# Patient Record
Sex: FEMALE | Race: BLACK OR AFRICAN AMERICAN | NOT HISPANIC OR LATINO | Employment: UNEMPLOYED | URBAN - METROPOLITAN AREA
[De-identification: names, ages, dates, MRNs, and addresses within clinical notes are randomized per-mention and may not be internally consistent; named-entity substitution may affect disease eponyms.]

---

## 2023-02-07 PROBLEM — L83 ACANTHOSIS NIGRICANS: Status: ACTIVE | Noted: 2023-02-07

## 2023-02-07 PROBLEM — R03.0 ELEVATED BLOOD PRESSURE READING: Status: ACTIVE | Noted: 2023-02-07

## 2023-02-07 PROBLEM — D64.9 ANEMIA: Status: ACTIVE | Noted: 2023-02-07

## 2023-02-07 PROBLEM — M79.642 PAIN OF LEFT HAND: Status: ACTIVE | Noted: 2023-02-07

## 2023-02-07 PROBLEM — L72.0 EPIDERMOID CYST OF SKIN: Status: ACTIVE | Noted: 2023-02-07

## 2023-02-07 PROBLEM — E66.9 OBESITY: Status: RESOLVED | Noted: 2023-02-07 | Resolved: 2023-02-07

## 2023-02-07 PROBLEM — R63.5 ABNORMAL WEIGHT GAIN: Status: ACTIVE | Noted: 2023-02-07

## 2023-02-07 PROBLEM — N63.0 BREAST LUMP: Status: RESOLVED | Noted: 2023-02-07 | Resolved: 2023-02-07

## 2023-02-07 PROBLEM — R73.01 IMPAIRED FASTING GLUCOSE: Status: ACTIVE | Noted: 2023-02-07

## 2023-02-07 PROBLEM — R73.9 HYPERGLYCEMIA: Status: RESOLVED | Noted: 2023-02-07 | Resolved: 2023-02-07

## 2023-02-07 PROBLEM — R35.0 INCREASED FREQUENCY OF URINATION: Status: RESOLVED | Noted: 2023-02-07 | Resolved: 2023-02-07

## 2023-02-07 PROBLEM — O10.013: Status: ACTIVE | Noted: 2023-02-07

## 2023-02-07 PROBLEM — E88.819 INSULIN RESISTANCE: Status: ACTIVE | Noted: 2023-02-07

## 2023-02-07 PROBLEM — L91.8 SKIN TAGS, MULTIPLE ACQUIRED: Status: ACTIVE | Noted: 2023-02-07

## 2023-03-10 LAB
GRAM STAIN: ABNORMAL
TISSUE/WOUND CULTURE/SMEAR: ABNORMAL
TISSUE/WOUND CULTURE/SMEAR: ABNORMAL

## 2023-03-25 RX ORDER — NIFEDIPINE 30 MG/1
1 TABLET, FILM COATED, EXTENDED RELEASE ORAL EVERY 12 HOURS
COMMUNITY
Start: 2022-08-22

## 2023-03-25 RX ORDER — FERROUS SULFATE 325(65) MG
1 TABLET ORAL
COMMUNITY

## 2023-03-25 RX ORDER — BLOOD-GLUCOSE CONTROL, NORMAL
EACH MISCELLANEOUS
COMMUNITY

## 2023-03-25 RX ORDER — PEN NEEDLE, DIABETIC 30 GX3/16"
NEEDLE, DISPOSABLE MISCELLANEOUS
COMMUNITY

## 2023-03-25 RX ORDER — PRENATAL VIT NO.126/IRON/FOLIC 28MG-0.8MG
1 TABLET ORAL DAILY
COMMUNITY
Start: 2022-08-04

## 2023-03-25 RX ORDER — DOCUSATE SODIUM 100 MG/1
1 CAPSULE, LIQUID FILLED ORAL 2 TIMES DAILY
COMMUNITY
Start: 2022-08-22

## 2023-03-25 RX ORDER — INSULIN PUMP SYRINGE, 3 ML
EACH MISCELLANEOUS
COMMUNITY

## 2023-03-30 ENCOUNTER — APPOINTMENT (OUTPATIENT)
Dept: PRIMARY CARE | Facility: CLINIC | Age: 23
End: 2023-03-30
Payer: COMMERCIAL

## 2023-04-13 ENCOUNTER — APPOINTMENT (OUTPATIENT)
Dept: PRIMARY CARE | Facility: CLINIC | Age: 23
End: 2023-04-13
Payer: COMMERCIAL

## 2023-04-25 ENCOUNTER — APPOINTMENT (OUTPATIENT)
Dept: PRIMARY CARE | Facility: CLINIC | Age: 23
End: 2023-04-25
Payer: COMMERCIAL

## 2023-06-01 ENCOUNTER — APPOINTMENT (OUTPATIENT)
Dept: PRIMARY CARE | Facility: CLINIC | Age: 23
End: 2023-06-01
Payer: COMMERCIAL

## 2023-07-03 ENCOUNTER — OFFICE VISIT (OUTPATIENT)
Dept: PRIMARY CARE | Facility: CLINIC | Age: 23
End: 2023-07-03
Payer: COMMERCIAL

## 2023-07-03 VITALS
HEART RATE: 93 BPM | DIASTOLIC BLOOD PRESSURE: 83 MMHG | BODY MASS INDEX: 40.04 KG/M2 | HEIGHT: 63 IN | TEMPERATURE: 97.1 F | OXYGEN SATURATION: 98 % | SYSTOLIC BLOOD PRESSURE: 133 MMHG | WEIGHT: 226 LBS

## 2023-07-03 DIAGNOSIS — I10 HYPERTENSION, UNSPECIFIED TYPE: ICD-10-CM

## 2023-07-03 DIAGNOSIS — L73.2 HIDRADENITIS SUPPURATIVA: ICD-10-CM

## 2023-07-03 DIAGNOSIS — O24.414 INSULIN CONTROLLED GESTATIONAL DIABETES MELLITUS (GDM) DURING PREGNANCY, ANTEPARTUM (HHS-HCC): Primary | ICD-10-CM

## 2023-07-03 PROCEDURE — 99213 OFFICE O/P EST LOW 20 MIN: CPT

## 2023-07-03 PROCEDURE — 1036F TOBACCO NON-USER: CPT

## 2023-07-03 PROCEDURE — 3075F SYST BP GE 130 - 139MM HG: CPT

## 2023-07-03 PROCEDURE — 3079F DIAST BP 80-89 MM HG: CPT

## 2023-07-03 RX ORDER — HYDROCHLOROTHIAZIDE 12.5 MG/1
12.5 CAPSULE ORAL
COMMUNITY
Start: 2020-03-03

## 2023-07-03 RX ORDER — CLINDAMYCIN PHOSPHATE 10 MG/G
GEL TOPICAL 2 TIMES DAILY
Qty: 60 G | Refills: 5 | Status: SHIPPED | OUTPATIENT
Start: 2023-07-03 | End: 2024-07-02

## 2023-07-03 RX ORDER — AMLODIPINE BESYLATE 5 MG/1
5 TABLET ORAL
COMMUNITY
Start: 2020-03-03

## 2023-07-03 ASSESSMENT — PAIN SCALES - GENERAL: PAINLEVEL: 0-NO PAIN

## 2023-07-03 NOTE — PATIENT INSTRUCTIONS
Thank you for coming in to see us today, Ms. Sisi Rosenbaum! It was a pleasure managing your health together.    Today in clinic, we discussed the bump on your wrist which we think is a ganglion cyst. This is benign and does not need to be removed unless its causing you pain. Also we think that the bumps on your breast is something called hydradenitis suppurativa and we will give you a topical antibiotic for it. It's called clindamycin.     If we ordered bloodwork today, you can get this done at ANY  location and the results will come to me directly. The [facility] here at [facility] are walk-in (no appointment needed); [facility]'s hours are M-F 6:30a-6p and Sat 8a-12p.    If you have any questions/concerns, you can always use [employer] to message me directly. Or if you prefer, you can call the office at [phone]; if you leave a message, the office staff should translate this to a message in my inbox.    I'm looking forward to seeing you back in clinic in 3 months to discuss your overall health.    Best,  Dr. Del Toro

## 2023-07-03 NOTE — PROGRESS NOTES
"Subjective     Patient ID: Sisi Rosenbaum is a 22 y.o. female who presents for skin changes.     HPI  22 year old female here to establish care with a new PCP. patient doing well with a bump on extensor side of her wrist and \"boils\"     #bump on dorsal wrist   - States that she had noticed it at the start of February and that then increased in size.   - The bump then decreased in size in May and the end of .   - Denies pain, decreased ROM, erythema, or increased warmth.   - Denies fever/chills     #Boils along intermammary cleft  - states that she experiences pain when the boil first develops and after it \"pops.\" Pain of 4-5/10   - endorses drainage of pus-like fluid.   - started when she was a teenager and states that the bumps comes and goes.   - went to urgent care and  stated that there was drainage.   - pt has not applied any topical ointments to the area.   - denies any sick sx of fever, chills.     #bumps at bilateral groin and mons pubis   - states that the bumps come and go. States that she feels them but has not observed them to characterize them visually.   - the bumps are currently not present per patient as they come and go.   - denies any itchiness, redness, pain.   - denies applying any OTC topical medications.     OBSTETRIC HISTORY:  G/P:   Abortions: 0  Vaginal Delivery: 1    GYNECOLOGICAL HISTORY:  Menarche: regular   LMP: 2023  Last PAP: 21   History of Abnormal PAP: none  Procedures: none     PAST MEDICAL HISTORY:  - Class III Obesity     PAST SURGICAL HISTORY:  - denies     FAMILY HISTORY:  - mom, sister-HTN  - dad, brother- DMII   - maternal uncle- cancer ( unknown type )    SOCIAL HISTORY:  Tobacco: denies   ETOH: 1 glass of wine once/week  Drug: denies   Sexual hx: sexually active with one partner   Occupation: stay at home mom     ALLERGIES:  NKDA     RECENT HOSPITALIZATIONS:  deniies    RECENT PROCEDURES:  denies    PAST PREVENTATIVE CARE:  PAP    - Delivery Date: 23  - " Method of Delivery:   - GA at Delivery: 34.5 weeks  - Baby Gender: M  - Baby Name: Lisa   - Pregnancy Notables: gHTN, gDMII,siPEC  - Delivery Complications: denies   - Cobb Complications: denies  - Postpartum Complications: denies  - Mom's Weight: BMI of 40.68  - Mom's Mood: edinburg score of 7   - Feeding: formula feeding   - Contraception: condoms sometimes   - Sexually Active since delivery: yes with one partner   - Living Situation: lives with mom and baby  - Source(s) of support: mom      Review of Systems  12 point ROS negative except as stated in HPI.      Objective   Physical Exam  Gen: NAD, nontox, well developed   HEENT: NCAT. EOMI. MMM.  RESP: no resp distress, talks in full sentences, CTABL  CVS: non-tachycardic, RRR  ABD: Soft, non-distended  Psych: appropriately answers questions. normal mood and affect  MSK: appears to have Full range of motion on all extremities. Well-circumscribed, mobile cystic mass approx 2.5 cm in diameter with no skin changes surrounding mass. Transillumination of mass upon shining light through mass.   SKIN: elevated nodules tracking linearly down intermammary cleft with some scarring and hyperpigmentation. No drainage noted.   : no lesions, erythema noted.     Assessment/Plan   22 year old female here to establish care with a new PCP and for assessment of various skin findings.     # hydradenitis suppurativa   - chronic as pt endorses intermittent presentation for many years.   - physical exam with classic findings of papules forming tracts with scarring along tract formation and hyperpigmentation.   - ordered topical clindamycin to be applied to region.     #ganglion cyst of dorsal wrist   :: ddx included lipoma but the mass transilluminated increasing suspicion for ganglion cyst.   - no sx of pain, weakness, decreased ROM.   - pt will monitor over time for growth/ symptoms. No acute intervention.     #DM screening   - family hx of DMII and pmh of gDM  - BMI of  "40.68   - denies sx of increased urinary frequency   - ordered HbA1c pending results.   - urine albumin ordered and pending     #HTN   - hx of gestational HTN and siPEC  - controlled on nifedipine 30 mg   - BP on arrival- 133/83   - ordered CBC to r/o anemia as indirect cause of elevated BP     # folliculitis   - on genital exam, no rash, papules or nodules observed.   - description of the \"\"bumps\" in area where patient shaves alludes to possible folliculitis   - advised warm compresses and supportive care.     RTC in 3 months for HM     Seen and discussed with Dr. Gladys Del Toro MD, MPH   Family Medicine and Preventive Health, PGY-1                    "

## 2023-07-18 NOTE — PROGRESS NOTES
I saw and evaluated the patient. I personally obtained the key and critical portions of the history and physical exam or was physically present for key and critical portions performed by the resident. I reviewed the resident's documentation and discussed the patient with the resident. I agree with the resident's medical decision making as documented in the note.    Christina Graham MD

## 2023-10-05 ENCOUNTER — APPOINTMENT (OUTPATIENT)
Dept: PRIMARY CARE | Facility: CLINIC | Age: 23
End: 2023-10-05
Payer: COMMERCIAL

## 2023-12-13 ENCOUNTER — TELEPHONE (OUTPATIENT)
Dept: OBSTETRICS AND GYNECOLOGY | Facility: CLINIC | Age: 23
End: 2023-12-13